# Patient Record
Sex: FEMALE | Race: WHITE | NOT HISPANIC OR LATINO | Employment: FULL TIME
[De-identification: names, ages, dates, MRNs, and addresses within clinical notes are randomized per-mention and may not be internally consistent; named-entity substitution may affect disease eponyms.]

---

## 2015-08-17 LAB
HPV_EXT - HISTORICAL: NORMAL
PAP SMEAR - HIM PATIENT REPORTED: NORMAL

## 2017-01-16 ENCOUNTER — RECORDS - HEALTHEAST (OUTPATIENT)
Dept: ADMINISTRATIVE | Facility: OTHER | Age: 32
End: 2017-01-16

## 2017-02-06 ENCOUNTER — COMMUNICATION - HEALTHEAST (OUTPATIENT)
Dept: FAMILY MEDICINE | Facility: CLINIC | Age: 32
End: 2017-02-06

## 2017-03-15 ENCOUNTER — COMMUNICATION - HEALTHEAST (OUTPATIENT)
Dept: FAMILY MEDICINE | Facility: CLINIC | Age: 32
End: 2017-03-15

## 2017-05-15 ENCOUNTER — COMMUNICATION - HEALTHEAST (OUTPATIENT)
Dept: FAMILY MEDICINE | Facility: CLINIC | Age: 32
End: 2017-05-15

## 2017-06-09 ENCOUNTER — OFFICE VISIT - HEALTHEAST (OUTPATIENT)
Dept: FAMILY MEDICINE | Facility: CLINIC | Age: 32
End: 2017-06-09

## 2017-06-09 DIAGNOSIS — R51.9 CHRONIC HEADACHES: ICD-10-CM

## 2017-06-09 DIAGNOSIS — G89.29 CHRONIC HEADACHES: ICD-10-CM

## 2017-06-09 ASSESSMENT — MIFFLIN-ST. JEOR: SCORE: 1493.38

## 2017-06-20 ENCOUNTER — COMMUNICATION - HEALTHEAST (OUTPATIENT)
Dept: FAMILY MEDICINE | Facility: CLINIC | Age: 32
End: 2017-06-20

## 2017-06-21 ENCOUNTER — COMMUNICATION - HEALTHEAST (OUTPATIENT)
Dept: SCHEDULING | Facility: CLINIC | Age: 32
End: 2017-06-21

## 2017-08-18 ENCOUNTER — COMMUNICATION - HEALTHEAST (OUTPATIENT)
Dept: SCHEDULING | Facility: CLINIC | Age: 32
End: 2017-08-18

## 2017-09-08 ENCOUNTER — COMMUNICATION - HEALTHEAST (OUTPATIENT)
Dept: HEALTH INFORMATION MANAGEMENT | Facility: CLINIC | Age: 32
End: 2017-09-08

## 2017-09-15 ENCOUNTER — COMMUNICATION - HEALTHEAST (OUTPATIENT)
Dept: FAMILY MEDICINE | Facility: CLINIC | Age: 32
End: 2017-09-15

## 2017-09-22 ENCOUNTER — OFFICE VISIT - HEALTHEAST (OUTPATIENT)
Dept: FAMILY MEDICINE | Facility: CLINIC | Age: 32
End: 2017-09-22

## 2017-09-22 DIAGNOSIS — J02.9 PHARYNGITIS: ICD-10-CM

## 2017-09-22 DIAGNOSIS — J01.90 ACUTE SINUSITIS: ICD-10-CM

## 2017-09-22 ASSESSMENT — MIFFLIN-ST. JEOR: SCORE: 1511.52

## 2017-09-28 ENCOUNTER — COMMUNICATION - HEALTHEAST (OUTPATIENT)
Dept: FAMILY MEDICINE | Facility: CLINIC | Age: 32
End: 2017-09-28

## 2017-10-28 ENCOUNTER — COMMUNICATION - HEALTHEAST (OUTPATIENT)
Dept: FAMILY MEDICINE | Facility: CLINIC | Age: 32
End: 2017-10-28

## 2017-12-12 ENCOUNTER — COMMUNICATION - HEALTHEAST (OUTPATIENT)
Dept: FAMILY MEDICINE | Facility: CLINIC | Age: 32
End: 2017-12-12

## 2018-02-02 ENCOUNTER — COMMUNICATION - HEALTHEAST (OUTPATIENT)
Dept: FAMILY MEDICINE | Facility: CLINIC | Age: 33
End: 2018-02-02

## 2018-02-13 ENCOUNTER — COMMUNICATION - HEALTHEAST (OUTPATIENT)
Dept: FAMILY MEDICINE | Facility: CLINIC | Age: 33
End: 2018-02-13

## 2018-02-22 ENCOUNTER — OFFICE VISIT - HEALTHEAST (OUTPATIENT)
Dept: FAMILY MEDICINE | Facility: CLINIC | Age: 33
End: 2018-02-22

## 2018-02-22 DIAGNOSIS — R51.9 HEADACHE: ICD-10-CM

## 2018-02-22 LAB
ALBUMIN SERPL-MCNC: 4.2 G/DL (ref 3.5–5)
ALP SERPL-CCNC: 96 U/L (ref 45–120)
ALT SERPL W P-5'-P-CCNC: 22 U/L (ref 0–45)
ANION GAP SERPL CALCULATED.3IONS-SCNC: 10 MMOL/L (ref 5–18)
AST SERPL W P-5'-P-CCNC: 24 U/L (ref 0–40)
BILIRUB SERPL-MCNC: 0.6 MG/DL (ref 0–1)
BUN SERPL-MCNC: 12 MG/DL (ref 8–22)
CALCIUM SERPL-MCNC: 9.5 MG/DL (ref 8.5–10.5)
CHLORIDE BLD-SCNC: 104 MMOL/L (ref 98–107)
CO2 SERPL-SCNC: 25 MMOL/L (ref 22–31)
CREAT SERPL-MCNC: 0.71 MG/DL (ref 0.6–1.1)
ERYTHROCYTE [DISTWIDTH] IN BLOOD BY AUTOMATED COUNT: 10.8 % (ref 11–14.5)
GFR SERPL CREATININE-BSD FRML MDRD: >60 ML/MIN/1.73M2
GLUCOSE BLD-MCNC: 81 MG/DL (ref 70–125)
HCT VFR BLD AUTO: 40.7 % (ref 35–47)
HGB BLD-MCNC: 14.2 G/DL (ref 12–16)
MCH RBC QN AUTO: 33.1 PG (ref 27–34)
MCHC RBC AUTO-ENTMCNC: 35 G/DL (ref 32–36)
MCV RBC AUTO: 95 FL (ref 80–100)
PLATELET # BLD AUTO: 247 THOU/UL (ref 140–440)
PMV BLD AUTO: 6.7 FL (ref 7–10)
POTASSIUM BLD-SCNC: 4.2 MMOL/L (ref 3.5–5)
PROT SERPL-MCNC: 8 G/DL (ref 6–8)
RBC # BLD AUTO: 4.29 MILL/UL (ref 3.8–5.4)
SODIUM SERPL-SCNC: 139 MMOL/L (ref 136–145)
TSH SERPL DL<=0.005 MIU/L-ACNC: 1.15 UIU/ML (ref 0.3–5)
VIT B12 SERPL-MCNC: 482 PG/ML (ref 213–816)
WBC: 4.8 THOU/UL (ref 4–11)

## 2018-02-22 ASSESSMENT — MIFFLIN-ST. JEOR: SCORE: 1506.99

## 2018-04-03 ENCOUNTER — OFFICE VISIT - HEALTHEAST (OUTPATIENT)
Dept: FAMILY MEDICINE | Facility: CLINIC | Age: 33
End: 2018-04-03

## 2018-04-03 DIAGNOSIS — J18.1 LOBAR PNEUMONIA, UNSPECIFIED ORGANISM (H): ICD-10-CM

## 2018-04-03 DIAGNOSIS — J18.9 PNEUMONIA OF LEFT LOWER LOBE DUE TO INFECTIOUS ORGANISM: ICD-10-CM

## 2018-04-03 LAB
ALBUMIN UR-MCNC: NEGATIVE MG/DL
ANION GAP SERPL CALCULATED.3IONS-SCNC: 11 MMOL/L (ref 5–18)
APPEARANCE UR: CLEAR
BASOPHILS # BLD AUTO: 0 THOU/UL (ref 0–0.2)
BASOPHILS NFR BLD AUTO: 0 % (ref 0–2)
BILIRUB UR QL STRIP: NEGATIVE
BUN SERPL-MCNC: 10 MG/DL (ref 8–22)
CALCIUM SERPL-MCNC: 9.5 MG/DL (ref 8.5–10.5)
CHLORIDE BLD-SCNC: 102 MMOL/L (ref 98–107)
CO2 SERPL-SCNC: 26 MMOL/L (ref 22–31)
COLOR UR AUTO: YELLOW
CREAT SERPL-MCNC: 0.78 MG/DL (ref 0.6–1.1)
EOSINOPHIL # BLD AUTO: 0 THOU/UL (ref 0–0.4)
EOSINOPHIL NFR BLD AUTO: 0 % (ref 0–6)
ERYTHROCYTE [DISTWIDTH] IN BLOOD BY AUTOMATED COUNT: 11.8 % (ref 11–14.5)
FLUAV AG SPEC QL IA: NORMAL
FLUBV AG SPEC QL IA: NORMAL
GFR SERPL CREATININE-BSD FRML MDRD: >60 ML/MIN/1.73M2
GLUCOSE BLD-MCNC: 94 MG/DL (ref 70–125)
GLUCOSE UR STRIP-MCNC: NEGATIVE MG/DL
HCG UR QL: NEGATIVE
HCT VFR BLD AUTO: 41.9 % (ref 35–47)
HGB BLD-MCNC: 15 G/DL (ref 12–16)
HGB UR QL STRIP: ABNORMAL
KETONES UR STRIP-MCNC: ABNORMAL MG/DL
LEUKOCYTE ESTERASE UR QL STRIP: NEGATIVE
LYMPHOCYTES # BLD AUTO: 0.6 THOU/UL (ref 0.8–4.4)
LYMPHOCYTES NFR BLD AUTO: 17 % (ref 20–40)
MCH RBC QN AUTO: 33 PG (ref 27–34)
MCHC RBC AUTO-ENTMCNC: 35.8 G/DL (ref 32–36)
MCV RBC AUTO: 92 FL (ref 80–100)
MONOCYTES # BLD AUTO: 0.3 THOU/UL (ref 0–0.9)
MONOCYTES NFR BLD AUTO: 9 % (ref 2–10)
NEUTROPHILS # BLD AUTO: 2.7 THOU/UL (ref 2–7.7)
NEUTROPHILS NFR BLD AUTO: 74 % (ref 50–70)
NITRATE UR QL: NEGATIVE
PH UR STRIP: 8.5 [PH] (ref 5–8)
PLATELET # BLD AUTO: 157 THOU/UL (ref 140–440)
PMV BLD AUTO: 9.1 FL (ref 8.5–12.5)
POTASSIUM BLD-SCNC: 4.2 MMOL/L (ref 3.5–5)
RBC # BLD AUTO: 4.55 MILL/UL (ref 3.8–5.4)
SODIUM SERPL-SCNC: 139 MMOL/L (ref 136–145)
SP GR UR STRIP: 1.01 (ref 1–1.03)
SP GR UR STRIP: 1.01 (ref 1–1.03)
UROBILINOGEN UR STRIP-ACNC: ABNORMAL
WBC: 3.7 THOU/UL (ref 4–11)

## 2018-04-13 ENCOUNTER — OFFICE VISIT - HEALTHEAST (OUTPATIENT)
Dept: FAMILY MEDICINE | Facility: CLINIC | Age: 33
End: 2018-04-13

## 2018-04-13 DIAGNOSIS — J18.9 PNEUMONIA OF LEFT LOWER LOBE DUE TO INFECTIOUS ORGANISM: ICD-10-CM

## 2018-04-25 ENCOUNTER — COMMUNICATION - HEALTHEAST (OUTPATIENT)
Dept: HEALTH INFORMATION MANAGEMENT | Facility: CLINIC | Age: 33
End: 2018-04-25

## 2018-04-25 ENCOUNTER — COMMUNICATION - HEALTHEAST (OUTPATIENT)
Dept: TELEHEALTH | Facility: CLINIC | Age: 33
End: 2018-04-25

## 2018-06-20 ENCOUNTER — COMMUNICATION - HEALTHEAST (OUTPATIENT)
Dept: FAMILY MEDICINE | Facility: CLINIC | Age: 33
End: 2018-06-20

## 2018-09-11 ENCOUNTER — COMMUNICATION - HEALTHEAST (OUTPATIENT)
Dept: FAMILY MEDICINE | Facility: CLINIC | Age: 33
End: 2018-09-11

## 2018-09-11 DIAGNOSIS — R51.9 HEADACHE: ICD-10-CM

## 2018-09-13 ENCOUNTER — COMMUNICATION - HEALTHEAST (OUTPATIENT)
Dept: FAMILY MEDICINE | Facility: CLINIC | Age: 33
End: 2018-09-13

## 2018-10-16 ENCOUNTER — COMMUNICATION - HEALTHEAST (OUTPATIENT)
Dept: FAMILY MEDICINE | Facility: CLINIC | Age: 33
End: 2018-10-16

## 2018-11-02 ENCOUNTER — COMMUNICATION - HEALTHEAST (OUTPATIENT)
Dept: FAMILY MEDICINE | Facility: CLINIC | Age: 33
End: 2018-11-02

## 2018-11-02 ENCOUNTER — OFFICE VISIT - HEALTHEAST (OUTPATIENT)
Dept: FAMILY MEDICINE | Facility: CLINIC | Age: 33
End: 2018-11-02

## 2018-11-02 DIAGNOSIS — Z82.49 FAMILY HISTORY OF CARDIAC DISORDER: ICD-10-CM

## 2018-11-02 DIAGNOSIS — J35.8 APHTHOUS ULCER OF TONSIL: ICD-10-CM

## 2018-11-02 ASSESSMENT — MIFFLIN-ST. JEOR: SCORE: 1527.68

## 2018-11-12 ENCOUNTER — COMMUNICATION - HEALTHEAST (OUTPATIENT)
Dept: FAMILY MEDICINE | Facility: CLINIC | Age: 33
End: 2018-11-12

## 2018-11-12 DIAGNOSIS — R09.89 TONSIL SYMPTOM: ICD-10-CM

## 2018-11-16 ENCOUNTER — OFFICE VISIT - HEALTHEAST (OUTPATIENT)
Dept: OTOLARYNGOLOGY | Facility: CLINIC | Age: 33
End: 2018-11-16

## 2018-11-16 DIAGNOSIS — J35.9 LESION OF TONSIL: ICD-10-CM

## 2018-11-20 ENCOUNTER — COMMUNICATION - HEALTHEAST (OUTPATIENT)
Dept: FAMILY MEDICINE | Facility: CLINIC | Age: 33
End: 2018-11-20

## 2018-11-21 ENCOUNTER — COMMUNICATION - HEALTHEAST (OUTPATIENT)
Dept: OTOLARYNGOLOGY | Facility: CLINIC | Age: 33
End: 2018-11-21

## 2018-11-21 LAB
LAB AP CHARGES (HE HISTORICAL CONVERSION): NORMAL
PATH REPORT.COMMENTS IMP SPEC: NORMAL
PATH REPORT.COMMENTS IMP SPEC: NORMAL
PATH REPORT.FINAL DX SPEC: NORMAL
PATH REPORT.GROSS SPEC: NORMAL
PATH REPORT.MICROSCOPIC SPEC OTHER STN: NORMAL
PATH REPORT.RELEVANT HX SPEC: NORMAL
RESULT FLAG (HE HISTORICAL CONVERSION): NORMAL

## 2018-11-26 ENCOUNTER — COMMUNICATION - HEALTHEAST (OUTPATIENT)
Dept: OTOLARYNGOLOGY | Facility: CLINIC | Age: 33
End: 2018-11-26

## 2018-11-27 ENCOUNTER — AMBULATORY - HEALTHEAST (OUTPATIENT)
Dept: OTOLARYNGOLOGY | Facility: CLINIC | Age: 33
End: 2018-11-27

## 2018-11-27 DIAGNOSIS — J35.8 APHTHOUS ULCER OF TONSIL: ICD-10-CM

## 2018-12-07 ENCOUNTER — AMBULATORY - HEALTHEAST (OUTPATIENT)
Dept: LAB | Facility: CLINIC | Age: 33
End: 2018-12-07

## 2018-12-07 DIAGNOSIS — Z82.49 FAMILY HISTORY OF CARDIAC DISORDER: ICD-10-CM

## 2018-12-07 LAB
CHOLEST SERPL-MCNC: 133 MG/DL
FASTING STATUS PATIENT QL REPORTED: YES
HDLC SERPL-MCNC: 67 MG/DL
LDLC SERPL CALC-MCNC: 55 MG/DL
TRIGL SERPL-MCNC: 55 MG/DL

## 2018-12-11 ENCOUNTER — COMMUNICATION - HEALTHEAST (OUTPATIENT)
Dept: FAMILY MEDICINE | Facility: CLINIC | Age: 33
End: 2018-12-11

## 2018-12-14 ENCOUNTER — OFFICE VISIT - HEALTHEAST (OUTPATIENT)
Dept: OTOLARYNGOLOGY | Facility: CLINIC | Age: 33
End: 2018-12-14

## 2018-12-14 DIAGNOSIS — R07.0 THROAT PAIN: ICD-10-CM

## 2018-12-31 ENCOUNTER — COMMUNICATION - HEALTHEAST (OUTPATIENT)
Dept: OTOLARYNGOLOGY | Facility: CLINIC | Age: 33
End: 2018-12-31

## 2018-12-31 DIAGNOSIS — E04.1 THYROID NODULE: ICD-10-CM

## 2019-01-04 ENCOUNTER — COMMUNICATION - HEALTHEAST (OUTPATIENT)
Dept: OTOLARYNGOLOGY | Facility: CLINIC | Age: 34
End: 2019-01-04

## 2019-01-04 ENCOUNTER — COMMUNICATION - HEALTHEAST (OUTPATIENT)
Dept: FAMILY MEDICINE | Facility: CLINIC | Age: 34
End: 2019-01-04

## 2019-02-01 ENCOUNTER — RECORDS - HEALTHEAST (OUTPATIENT)
Dept: ADMINISTRATIVE | Facility: OTHER | Age: 34
End: 2019-02-01

## 2019-02-13 ENCOUNTER — COMMUNICATION - HEALTHEAST (OUTPATIENT)
Dept: FAMILY MEDICINE | Facility: CLINIC | Age: 34
End: 2019-02-13

## 2019-08-23 ENCOUNTER — RECORDS - HEALTHEAST (OUTPATIENT)
Dept: HEALTH INFORMATION MANAGEMENT | Facility: CLINIC | Age: 34
End: 2019-08-23

## 2021-05-31 ENCOUNTER — RECORDS - HEALTHEAST (OUTPATIENT)
Dept: ADMINISTRATIVE | Facility: CLINIC | Age: 36
End: 2021-05-31

## 2021-05-31 VITALS — WEIGHT: 170 LBS | HEIGHT: 69 IN | BODY MASS INDEX: 25.18 KG/M2

## 2021-05-31 VITALS — WEIGHT: 166 LBS | HEIGHT: 69 IN | BODY MASS INDEX: 24.59 KG/M2

## 2021-06-01 ENCOUNTER — RECORDS - HEALTHEAST (OUTPATIENT)
Dept: ADMINISTRATIVE | Facility: CLINIC | Age: 36
End: 2021-06-01

## 2021-06-01 VITALS — WEIGHT: 169.2 LBS | BODY MASS INDEX: 25.17 KG/M2

## 2021-06-01 VITALS — HEIGHT: 69 IN | BODY MASS INDEX: 25.03 KG/M2 | WEIGHT: 169 LBS

## 2021-06-01 VITALS — WEIGHT: 163 LBS | BODY MASS INDEX: 24.25 KG/M2

## 2021-06-02 ENCOUNTER — RECORDS - HEALTHEAST (OUTPATIENT)
Dept: ADMINISTRATIVE | Facility: CLINIC | Age: 36
End: 2021-06-02

## 2021-06-02 VITALS — HEIGHT: 69 IN | WEIGHT: 173.56 LBS | BODY MASS INDEX: 25.71 KG/M2

## 2021-06-11 NOTE — PROGRESS NOTES
SUBJECTIVE:    This is a pleasant 32-year-old female who presents to the clinic as she has had chronic and recurrent headaches. Please see history as noted previously. As noted she continues to have a recurrence of severe headaches and has followed up with Dr. Smith of neurology previously. She states that there are times where she will have discomfort in the back of her neck and it will feel hot. She will get a radiation of pain from the occipital area to the left forehead area. She sometimes has discomfort involving the top of her head. She denies associated visual changes typically. These headaches may persist 1/2 to 1 hour at a time. She was evaluated by [facility]. It was felt that she had occipital neuralgia. She previously has been treated by a chiropractor. She has tried high doses of magnesium without significant relief. She reportedly did have an MRI the cervical spine without significant cervical nerve root impingement. They did discuss treatment with an occipital nerve block that was done without significant improvement. She cannot think of any specific triggers.  More recently they have been talking about trigger point injections but she does not know if she wants to consider this.  She does have significant number of stressors.      Patient Active Problem List   Diagnosis     Fatigue     Headache     Vitamin D Deficiency     Abdominal Pain In The Central Upper Belly (Epigastric)     Anemia     Dysmenorrhea       Current Outpatient Prescriptions on File Prior to Visit   Medication Sig     MULTIVIT &MINERALS/FERROUS FUM (MULTI VITAMIN ORAL) Take by mouth.     oxyCODONE-acetaminophen (PERCOCET) 7.5-325 mg per tablet Take 1 tablet by mouth every 4 (four) hours as needed for pain.     No current facility-administered medications on file prior to visit.        Allergies   Allergen Reactions     Sulfa (Sulfonamide Antibiotics) Hives     Vicodin [Hydrocodone-Acetaminophen]      Sulfasalazine  Rash            A 12 point comprehensive review of systems was negative except as noted.      OBJECTIVE:     Vitals:    06/09/17 1135   BP: 110/60   Pulse: 84   Resp: 16   Temp: 98.4  F (36.9  C)       General: Alert, not acutely distressed   Head:  Atraumatic, normocephalic  Eyes:  PERRL, extraocular movements are intact  Nose:  Septum midline  Throat:  Oral mucosa moist, oropharynx clear  Neck:  Supple without adenopathy or thyromegaly   Cardiovascular: S1-S2 with regular rate and without murmur, rub, or gallop   Lungs:  Clear to auscultation bilaterally   Extremities: Without cyanosis or edema   Neuro:  CN II-XII appear intact        Laboratory Results:    Results for orders placed or performed in visit on 09/15/16   Basic Metabolic Panel   Result Value Ref Range    Sodium 139 136 - 145 mmol/L    Potassium 4.4 3.5 - 5.0 mmol/L    Chloride 104 98 - 107 mmol/L    CO2 23 22 - 31 mmol/L    Anion Gap, Calculation 12 5 - 18 mmol/L    Glucose 91 70 - 125 mg/dL    Calcium 9.2 8.5 - 10.5 mg/dL    BUN 10 8 - 22 mg/dL    Creatinine 0.75 0.60 - 1.10 mg/dL    GFR MDRD Af Amer >60 >60 mL/min/1.73m2    GFR MDRD Non Af Amer >60 >60 mL/min/1.73m2         ASSESSMENT AND PLAN:    1. Chronic headaches  Occipital Neuralgia      Reviewed her headache history.  She has had an evaluation by neurology  Recommend she pay attention to possible triggers  Tryptans have not been very helpful in the past  She may consider therapeutic massage  Consider a TENS unit  Discussed that she could consider follow-up with neurology to discuss possible trigger point injections / Botox injections  Recommend using Percocet sparingly  Consider another opinion with neurology    25 minutes were spent with the patient and greater than 50% of the time was spent in face to face counseling and coordination of care      Dima Fay MD      This note has been dictated and transcribed using voice recognition software.  Any grammatical or contextual  distortions are unintentional and inherent to the software.

## 2021-06-13 NOTE — PROGRESS NOTES
Assessment/ Plan     1. Acute sinusitis    Recommend symptomatic treatment with fluids and rest  She may consider a Surprise pot  Recommend use of Mucinex  Treat with a course of prednisone  Antibiotics not prescribed currently.  We will try conservative measures first  Consider further evaluation with ENT or possibly a CT of the sinuses    2. Pharyngitis    Rapid strep test is negative and a throat culture is pending  - Rapid Strep A Screen-Throat  - Group A Strep, RNA Direct Detection, Throat    3.  Headaches, occipital region    Discussed she can consider conservative measures  Discussed possibility of a referral for acupuncture    25 minutes were spent with the patient and greater than 50% of the time was spent in face to face counseling and coordination of care      Subjective:       Nereyda Trivedi is a 32 y.o. female who presents to the clinic as she has had a unusual constellation of symptoms.  She has had a sore throat and notes that there is a discoloration involving her tonsil on the right side.  Her energy level has been low.  She has experienced significant nasal congestion and has had a sense of pressure in her cheeks.  She has been evaluated previously for chronic headaches and neck pain.  She has been assessed by neurology.  It was felt that she had occipital neuralgia. She previously has been treated by a chiropractor. She has tried high doses of magnesium without significant relief. She reportedly did have an MRI the cervical spine without significant cervical nerve root impingement. They did discuss treatment with an occipital nerve block that was done without significant improvement.  She is contemplating other treatments.    The following portions of the patient's history were reviewed and updated as appropriate: allergies, current medications, past family history, past medical history, past social history, past surgical history and problem list.    Review of Systems   A 12 point comprehensive  "review of systems was negative except as noted.      Current Outpatient Prescriptions   Medication Sig Dispense Refill     MULTIVIT &MINERALS/FERROUS FUM (MULTI VITAMIN ORAL) Take by mouth.       norgestimate-ethinyl estradiol (TRI-ESTARYLLA) 0.18/0.215/0.25 mg-35 mcg (28) Tab tablet Take 1 tablet by mouth.       oxyCODONE-acetaminophen (PERCOCET) 7.5-325 mg per tablet Take 1 tablet by mouth every 4 (four) hours as needed for pain. 30 tablet 0     WALLACE 0.35 mg tablet Take 0.35 tablets by mouth daily.  4     predniSONE (DELTASONE) 20 MG tablet Take one PO BID x 5 days 10 tablet 0     No current facility-administered medications for this visit.        Objective:      /60 (Patient Site: Left Arm, Patient Position: Sitting, Cuff Size: Adult Regular)  Pulse 84  Temp 97.7  F (36.5  C) (Oral)   Resp 16  Ht 5' 8.75\" (1.746 m)  Wt 170 lb (77.1 kg)  LMP 09/08/2017 (Approximate)  SpO2 100%  BMI 25.29 kg/m2      General appearance: alert, appears stated age and cooperative  Head: Normocephalic, without obvious abnormality, atraumatic  Eyes: conjunctivae/corneas clear. PERRL, EOM's intact.   Ears: normal TM's and external ear canals both ears  Nose: Nares normal. Septum midline. Mucosa normal. No drainage or sinus tenderness.  Throat: Oropharynx mildly erythematous with a bluish discoloration in the right palatine tonsil area  Neck: no adenopathy, supple, symmetrical, trachea midline and thyroid not enlarged, symmetric, no tenderness/mass/nodules  Back: symmetric, no curvature. ROM normal. No CVA tenderness.  Lungs: clear to auscultation bilaterally  Heart: regular rate and rhythm, S1, S2 normal, no murmur, click, rub or gallop  Neurologic: Alert and oriented X 3. Normal coordination and gait         No results found for this or any previous visit (from the past 168 hour(s)).       This note has been dictated using voice recognition software. Any grammatical or context distortions are unintentional and inherent " to the software

## 2021-06-16 NOTE — PROGRESS NOTES
Assessment/ Plan     1. Headache  Consider atypical migraine versus bicipital neuralgia versus other etiology    Reviewed previous history including MRI of the brain as well as cervical spine  She has followed up with Dr. Smith of [facility]    Recommend follow-up with [facility] for a second opinion  Check laboratory testing  Recommend monitoring for triggers including possible food triggers  She has been treated symptomatically with Tylenol and anti-inflammatory medication as needed  Recommend using Percocet sparingly    Recommend follow-up if having persistent symptoms  - Comprehensive Metabolic Panel  - HM2(CBC w/o Differential)  - Thyroid Cascade  - Vitamin B12  - Ambulatory referral to Neurology    Patient agrees to plan    25 minutes were spent with the patient and greater than 50% of the time was spent in face to face counseling and coordination of care      Subjective:       Nereyda Trivedi is a 32 y.o. female who presents to the clinic in follow-up for recurrent headaches.  She has a history of headaches that are frequent and consistent.  She has a long-standing history of headaches and as noted previously will have recurrence of severe headaches.  These typically start in the area of her neck and back of her head.  They may radiate to the top of her head or to the left side.  More recently, she has been experiencing right-sided pain.  These headaches may persist for 1/2 to 1 hour at a time.  In the past [facility].  It was felt that she had occipital neuralgia.  She did have an MRI of the cervical spine without significant cervical nerve impingement.  An occipital nerve block did not result in improvement.  They did discuss possible trigger point injections but she has been reluctant to consider this.  She has seen Dr. Smith previously.  She also was followed up with her chiropractor.  She has tried high doses of magnesium.  She  "has taken Tylenol, Excedrin, and oxycodone for pain.  She does have a lot of stress and some anxiety.  She is active.  These headaches can be disabling at times.    She has had previous imaging of the brain as well as the cervical spine.  At one point she was referred to [email or web address] of neurology but did not follow-up there.  This has been very challenging for her.  She denies focal weakness or numbness and tingling in extremities.        The following portions of the patient's history were reviewed and updated as appropriate: allergies, current medications, past family history, past medical history, past social history, past surgical history and problem list.    Review of Systems   A 12 point comprehensive review of systems was negative except as noted.      Current Outpatient Prescriptions   Medication Sig Dispense Refill     MULTIVIT &MINERALS/FERROUS FUM (MULTI VITAMIN ORAL) Take by mouth.       norgestimate-ethinyl estradiol (TRI-ESTARYLLA) 0.18/0.215/0.25 mg-35 mcg (28) Tab tablet Take 1 tablet by mouth.       No current facility-administered medications for this visit.        Objective:      /60  Pulse 76  Temp 98.2  F (36.8  C) (Oral)   Resp 16  Ht 5' 8.75\" (1.746 m)  Wt 169 lb (76.7 kg)  BMI 25.14 kg/m2      General appearance: alert, appears stated age and cooperative  Head: Normocephalic, without obvious abnormality, atraumatic  Eyes: conjunctivae/corneas clear. PERRL, EOM's intact.   Ears: normal TM's and external ear canals both ears  Nose: Nares normal. Septum midline. Mucosa normal. No drainage or sinus tenderness.  Throat: lips, mucosa, and tongue normal; teeth and gums normal  Neck: no adenopathy, supple, symmetrical, trachea midline and thyroid not enlarged  Lungs: clear to auscultation bilaterally  Heart: regular rate and rhythm, S1, S2 normal, no murmur, click, rub or gallop  Extremities: extremities normal, atraumatic, no cyanosis or edema  Skin: Skin color, texture, turgor " normal. No rashes or lesions  Lymph nodes: Cervical nodes normal.  Neurologic: Alert and oriented X 3. Normal coordination and gait         No results found for this or any previous visit (from the past 168 hour(s)).       This note has been dictated using voice recognition software. Any grammatical or context distortions are unintentional and inherent to the software

## 2021-06-17 NOTE — PROGRESS NOTES
Chief Complaint   Patient presents with     Follow-up     pneumonia          HPI:   Nereyda Trivedi is a 33 y.o. female in for follow up of pneumonia.  Seen on 4/3 and diagnosed with pneumonia.  Treated with levoquin.  There was a concern regarding PE,  CT showed no PE but left lower lobe pneumonia.  She has greatly improved.  Still some cough.  Gets winded a little more easily. No more fever.       ROS:  A 10 point comprehensive review of systems was negative except as noted.     Medications:  Current Outpatient Prescriptions on File Prior to Visit   Medication Sig Dispense Refill     MULTIVIT &MINERALS/FERROUS FUM (MULTI VITAMIN ORAL) Take by mouth.       norgestimate-ethinyl estradiol (TRI-ESTARYLLA) 0.18/0.215/0.25 mg-35 mcg (28) Tab tablet Take 1 tablet by mouth.       No current facility-administered medications on file prior to visit.          Social History:  Social History   Substance Use Topics     Smoking status: Never Smoker     Smokeless tobacco: Never Used      Comment: social     Alcohol use Not on file         Physical Exam:   Vitals:    04/13/18 0948   BP: 96/60   Pulse: 84   Resp: 12   Temp: 98.1  F (36.7  C)   Weight: 169 lb 3.2 oz (76.7 kg)       GENERAL:   Alert. Oriented.  EYES: Clear  HENT:  Ears: R TM pearly gray. Normal landmarks. L TM pearly gray.  Normal landmarks  Nose: Clear.  Sinuses: Nontender.  Oropharynx:  No erythema. No exudate.  NECK: Supple. No adenopathy.  LUNGS: Clear to ascultation.  No crackles.  No wheezing  HEART: RRR  SKIN:  No rash.     CHART REVIEW  DATE[facility]place of visit[facility], 4[facility]3/2018  DX: pneumonia  TESTS: CT PE run--LLL pneumonia.  No PE, normal CBC, UA,BMP negative flu  TREATMENT: Levaquin     Assessment/Plan:    1. Pneumonia of left lower lobe due to infectious organism        Improving with clear lung exam.  May resume normal activities as tolerated.  Recheck if not completely improved in 6-8 weeks.          Hayder Blackmon MD      4/13/2018

## 2021-06-17 NOTE — PROGRESS NOTES
"ASSESSMENT:   1. Pneumonia of left lower lobe due to infectious organism  Urinalysis    Pregnancy (Beta-hCG, Qual), Urine    HM1(CBC and Differential)    Basic Metabolic Panel    Influenza A/B Rapid Test    HM1 (CBC with Diff)    CTA Chest PE Run    CTA Chest PE Run    levoFLOXacin (LEVAQUIN) 750 MG tablet       PLAN:  32-year-old otherwise healthy female presents with several days of fever, cough, body aches, painful inspiration, dark sputum with questionable hemoptysis and tachycardia.  Of note, patient recently traveled to [place] via plane.  Positive exogenous estrogen use.  No calf tenderness or edema on exam.  Diminished left-sided breath sounds.  No chest wall tenderness.  Given multiple risk factors, questionable hemoptysis and tachycardia with pleuritic chest pain, I did elect to do a CT PE run of the patient's chest.  These results are negative for PE, does reveal a LLL pneumonia.  Labs are obtained, WBC 3.7, no electrolyte derangement.  Reexamination of the patient following ibuprofen reveals tachycardia to be improved, heart rate 108 on repeat exam.  Patient is treated for a community-acquired pneumonia with 5 days of levofloxacin.  Reasons for return either to clinic or emergency room were discussed at length.  She will follow-up with her primary care provider in 1 week for recheck.    I discussed red flag symptoms, return precautions to clinic/ER and follow up care with patient/parent.  Expected clinical course, symptomatic cares advised. Questions answered. Patient/parent amenable with plan.    Patient Instructions:  Patient Instructions   You have a left lower lobe pneumonia.  I have prescribed an antibiotic to treat this.    1. Take Levaquin twice daily with food. Take a probiotic such as Culturelle or Florastor while on the antibiotic or eat a Greek yogurt containing \"live active cultures\" daily.    Please use Tylenol 650mg every 4 hours or ibuprofen 600mg every 6 hours by mouth for pain/fever.  Do " not exceed 4000mg of acetaminophen or 2400mg of ibuprofen from any source in a 24 hour period.  Taking Tylenol and ibuprofen together may be helpful in reducing pain.    Return to clinic, ER with high fever not responding to tylenol/motrin, shortness of breath or severe worsening of symptoms.  See your primary care physician next week for a recheck.    Ciprofloxacin/Levofloxacin Discharge Instructions:  You have been prescribed the antibiotic, Levofloxacin, and will need to take it for the full course as prescribed.    This medicine has been associated with rare cases of tendinitis (inflammation of your tendons) and tendon rupture. This risk increases if you are also taking steroids.  Please stop this medicine and call your primary care provider if you develop joint pain, muscle aches, difficulty walking, or swelling in your arms or legs.    Rare, but possible, side effects of this medication also include changes in mood, hallucinations, difficulty sleeping and confusion.    Avoid taking this medication with milk, dairy products, or calcium-fortified juices or within 2 hours of iron or calcium supplementation.  Use sunscreen and avoid excessive sun exposure while taking this medicine because you are at higher risk of sunburn.      If you are diabetic, this medication could increase or decrease your blood sugars.  Please monitor your blood sugar closely while on this antibiotic.          SUBJECTIVE:   Nereyda Trivedi is a 32 y.o. female who presents today with 3 days of fever, cough, sore throat, body aches, chills. Bought some azithromycin in [place] and started that 2 days ago.  Decreased appetite, not drinking much.  Some facial pain.  Does also note some right sided neck stiffness.  Brown sputum, questionable hemoptysis. No calf pain or swelling. Does use OCP. Returned from trip to [place] last week. Notes painful inspiration, particularly to left low back.  Also complains of right discomfort. Denies urinary  frequency, dysuria or hematuria.  ROS:  Comprehensive 12 pt ROS completed, positives noted in HPI, otherwise negative.      Past Medical History:  Patient Active Problem List   Diagnosis     Fatigue     Headache     Vitamin D Deficiency     Abdominal Pain In The Central Upper Belly (Epigastric)     Anemia     Dysmenorrhea       Surgical History:  Past Surgical History:   Procedure Laterality Date     TX RX ECTOP PREG BY SCOPE,RMV TUBE/OVRY      Description: Laparoscopic Excision Of Ectopic Preg & Salpingectomy Left;  Recorded: 07/31/2014;           Family History:  No family history on file.    Reviewed; Non-contributory    History   Smoking Status     Never Smoker   Smokeless Tobacco     Never Used     Comment: social           Current Medications:  Current Outpatient Prescriptions on File Prior to Visit   Medication Sig Dispense Refill     MULTIVIT &MINERALS/FERROUS FUM (MULTI VITAMIN ORAL) Take by mouth.       norgestimate-ethinyl estradiol (TRI-ESTARYLLA) 0.18/0.215/0.25 mg-35 mcg (28) Tab tablet Take 1 tablet by mouth.       No current facility-administered medications on file prior to visit.        Allergies:   Allergies   Allergen Reactions     Sulfa (Sulfonamide Antibiotics) Hives     Vicodin [Hydrocodone-Acetaminophen]      Sulfasalazine Rash       OBJECTIVE:   Vitals:    04/03/18 1638   BP: 120/85   Pulse: (!) 130   Resp: 16   Temp: 100  F (37.8  C)   TempSrc: Oral   SpO2: 98%   Weight: 163 lb (73.9 kg)     Physical exam reveals a pleasant 32 y.o. female.   Appears healthy, alert and cooperative. Non-toxic appearance.  Eyes:  No conjunctivitis, lids normal.   Ears:  Normal appearing auricle. External auditory meatus without excessive cerumen, edema or erythema. normal TMs bilaterally and normal canals bilaterally. No mastoid tenderness. No pain with palpation over tragus.  Nose:    Mucosa normal. Mild clear rhinorrhea. Mild frontal sinus tenderness.  Mouth:  Mucosa pink and moist.  mild erythema. No  trismus. No evidence of PTA. Normal phonation.  Neck: few small anterior cervical nodes. Right trapezius tenderness, palpable spasm. No meningismus.  Lungs: Left lung fields diminished throughout, no wheezes, rales or rhonchi. Right lung clear.  Heart: Tachycardic, regular rhythm, no murmur, rub or gallop  Abdomen: soft, nontender. No masses or organomegaly  Skin: pink, warm, dry, and without lesions on limited skin exam. No rashes.     RADIOLOGY    Cta Chest Pe Run    Result Date: 4/3/2018  EXAM DATE:         04/03/2018 [facility] CT ANGIO PULMONARY 4/3/2018 6:30 PM INDICATION: Shortness of breath SOB, tachycardic, multiple ris TECHNIQUE: Helical acquisition through the chest was performed during the arterial phase of contrast enhancement using IV contrast. 2D and 3D reconstructions were performed by the CT technologist. Dose reduction techniques were used. IV CONTRAST: 100ml of ISOVUE 370 was administered. COMPARISON: None. FINDINGS: ANGIOGRAM CHEST: Pulmonary arteries are normal caliber and negative for pulmonary emboli. Normal caliber thoracic aorta with no dissection or aneurysm. RV/LV RATIO: Normal. LUNGS AND PLEURA: There is focal airspace consolidation in the left lower lobe consistent with pneumonia. There is no pneumothorax or pleural effusion. No pulmonary nodules or masses. The right lung is clear. MEDIASTINUM: There is no mediastinal lymphadenopathy there are mildly enlarged left hilar lymph nodes which are likely reactive given the left lower lobe infiltrate. No coronary artery calcification or significant pericardial effusion. LIMITED UPPER ABDOMEN: Negative. MUSCULOSKELETAL: Negative. CONCLUSION: 1.  No evidence for pulmonary embolus in either lung. No thoracic aortic aneurysm or thoracic aortic dissection. 2.  Left lower lobe airspace consolidation consistent with pneumonia. NOTE: ABNORMAL REPORT THE DICTATION ABOVE DESCRIBES AN ABNORMALITY FOR WHICH FOLLOW-UP IS NEEDED.        LABORATORY STUDIES    Recent Results (from the past 24 hour(s))   Basic Metabolic Panel   Result Value Ref Range    Sodium 139 136 - 145 mmol/L    Potassium 4.2 3.5 - 5.0 mmol/L    Chloride 102 98 - 107 mmol/L    CO2 26 22 - 31 mmol/L    Anion Gap, Calculation 11 5 - 18 mmol/L    Glucose 94 70 - 125 mg/dL    Calcium 9.5 8.5 - 10.5 mg/dL    BUN 10 8 - 22 mg/dL    Creatinine 0.78 0.60 - 1.10 mg/dL    GFR MDRD Af Amer >60 >60 mL/min/1.73m2    GFR MDRD Non Af Amer >60 >60 mL/min/1.73m2   Influenza A/B Rapid Test   Result Value Ref Range    Influenza  A, Rapid Antigen No Influenza A antigen detected No Influenza A antigen detected    Influenza B, Rapid Antigen No Influenza B antigen detected No Influenza B antigen detected   HM1 (CBC with Diff)   Result Value Ref Range    WBC 3.7 (L) 4.0 - 11.0 thou/uL    RBC 4.55 3.80 - 5.40 mill/uL    Hemoglobin 15.0 12.0 - 16.0 g/dL    Hematocrit 41.9 35.0 - 47.0 %    MCV 92 80 - 100 fL    MCH 33.0 27.0 - 34.0 pg    MCHC 35.8 32.0 - 36.0 g/dL    RDW 11.8 11.0 - 14.5 %    Platelets 157 140 - 440 thou/uL    MPV 9.1 8.5 - 12.5 fL    Neutrophils % 74 (H) 50 - 70 %    Lymphocytes % 17 (L) 20 - 40 %    Monocytes % 9 2 - 10 %    Eosinophils % 0 0 - 6 %    Basophils % 0 0 - 2 %    Neutrophils Absolute 2.7 2.0 - 7.7 thou/uL    Lymphocytes Absolute 0.6 (L) 0.8 - 4.4 thou/uL    Monocytes Absolute 0.3 0.0 - 0.9 thou/uL    Eosinophils Absolute 0.0 0.0 - 0.4 thou/uL    Basophils Absolute 0.0 0.0 - 0.2 thou/uL   Urinalysis   Result Value Ref Range    Color, UA Yellow Colorless, Yellow, Straw, Light Yellow    Clarity, UA Clear Clear    Glucose, UA Negative Negative    Bilirubin, UA Negative Negative    Ketones, UA 15 mg/dL (!) Negative    Specific Gravity, UA 1.010 1.005 - 1.030    Blood, UA Trace (!) Negative    pH, UA 8.5 (H) 5.0 - 8.0    Protein, UA Negative Negative mg/dL    Urobilinogen, UA 0.2 E.U./dL 0.2 E.U./dL, 1.0 E.U./dL    Nitrite, UA Negative Negative    Leukocytes, UA Negative  Negative   Pregnancy (Beta-hCG, Qual), Urine   Result Value Ref Range    Pregnancy Test, Urine Negative Negative    Specific Gravity, UA 1.010 1.001 - 1.030     Administrations This Visit     ibuprofen tablet 600 mg (ADVIL,MOTRIN)     Admin Date Action Dose Route Administered By             04/03/2018 Given 600 mg Oral Blayne Vaughan, SAMPSON Marley, CNP

## 2021-06-21 NOTE — PROGRESS NOTES
HPI: This patient is a 34 yo who presents for evaluation of the tonsils at the request of Dr. Fay.  About 1 month ago, patient had a sore throat and noticed a white patch on her right tonsil.  She's had this 2 times before and it eventual resolved after a couple rounds of antibiotics and time.  Currently her throat hurts and she has odonyphagia. Was given Magic Mouthwash a couple weeks ago for a presumed ulcer which helps reduce the pain, but spot is still present.  She does have right ear pain and tender lymph nodes on the right.  Some hoarseness over the past couple weeks. Denies fever, weight changes, dysphagia,  No other health concerns at this time. Remote history of smoking - quit 10 years ago.    Past medical history, surgical history, social history, family history, medications, and allergies have been reviewed with the patient and are documented above.    Review of Systems: a 10-system review was performed. Pertinent positives are noted in the HPI and on a separate scanned document in the chart.    PHYSICAL EXAMINATION:  GEN: no acute distress, normocephalic  EYES: extraocular movements are intact, pupils are equal and round. Sclera clear.   EARS: auricles are normally formed. The external auditory canals are clear with minimal to no cerumen. Tympanic membranes are intact bilaterally with no signs of infection, effusion, retractions, or perforations.  NOSE: anterior nares are patent. There are no masses or lesions. The septum is non-obstructing.  OC/OP: clear, dentition is in good repair. The tongue and palate are fully mobile and symmetric. Tonsils are 1+ without exudate. Right tonsil with 2 mm round, white/yellow, firm lesion.  HP/L (scope): nasopharynx, base of tongue, vallecula, epiglottis, and pyriform sinuses are clear. The bilateral vocal folds are mobile. There is no interarytenoid edema and erythema.   NECK: soft and supple. Tender to palpation on the right. Airway is midline.  TMJ: Tender to  palpation over the right TMJ.  NEURO: CN VII and XII are intact symmetric. alert and oriented. No nystagmus. Gait is normal.  PULM: breathing comfortably on room air, normal chest expansion with respiration  HEART: carotid pulses, no peripheral edema    PROCEDURE:  Right tonsil injected with lidocaine and epinephrine. A biopsy was taken of the lesion and hemostasis was achieved with pressure.    MEDICAL DECISION-MAKING: Neredya is a 34 yo with right tonsil lesion.  A biopsy was taken today. Patient may continue magic mouthwash for comfort.  Discussed that ear pain may be due to TMD and discussed lifestyle management. Reassured patient that the remainder of her exam was normal.  Will call patient with results of biopsy once available. She is with good understanding.

## 2021-06-21 NOTE — PROGRESS NOTES
[facility] Note    Nereyda Trivedi  1985   [id number]    Nereyda Trivedi is a 33 y.o. female with significant past medical history of headaches presenting to discuss the following:     CC:   Chief Complaint   Patient presents with     Sore Throat       HPI:    SORE THROAT -   - Bump in back of throat, covered by thick white stuff (pus?) inside tonsil  - Sore throat for few days, has postnasal drainage for last week  - 2 nights ago looked in back of throat and saw  - Also has some ear tenderness/itching deep/pressure  - Has had a few other times in same spot, [facility], had 2 rounds of antibiotics before resolution    Updated problem list, medications, and family history today. She does have strong family history of cardiac disease, mother in 40's, recommended lipid screening. She is not fasting today. Agrees to return for future lab only visit. No personal history of tobacco, hypertension, diabetes, or exertional chest pain.     ROS:   CONSTITUTIONAL: No fevers, chills last night but normal temp, appetite is okay but hurts to eat but cold feels good  HEENT: see above   LUNGS: no cough, chest a little congested   ABDOMEN: no abdominal pain, no nausea    PMH:   Patient Active Problem List   Diagnosis     Headache       No past medical history on file.    PSH:   Past Surgical History:   Procedure Laterality Date     [place] RX ECTOP PREG BY SCOPE,RMV TUBE/OVRY      Description: Laparoscopic Excision Of Ectopic Preg & Salpingectomy Left;  Recorded: 07/31/2014;         MEDICATIONS:   Current Outpatient Prescriptions on File Prior to Visit   Medication Sig Dispense Refill     MULTIVIT &MINERALS/FERROUS FUM (MULTI VITAMIN ORAL) Take by mouth.       [DISCONTINUED] norgestimate-ethinyl estradiol (TRI-ESTARYLLA) 0.18/0.215/0.25 mg-35 mcg (28) Tab tablet Take 1 tablet by mouth.       oxyCODONE-acetaminophen (PERCOCET/ENDOCET) 7.5-325 mg per tablet Take 1 tablet by mouth every 4 (four) hours as needed  "for pain. 25 tablet 0     No current facility-administered medications on file prior to visit.        ALLERGIES:  Allergies   Allergen Reactions     Sulfa (Sulfonamide Antibiotics) Hives     Vicodin [Hydrocodone-Acetaminophen]      Sulfasalazine Rash       FAMHx:  Family History   Problem Relation Age of Onset     Coronary artery disease Mother      upper 40's     Hypertension Mother      No Medical Problems Father      No Medical Problems Brother      No Medical Problems Daughter      No Medical Problems Son      Kidney failure Maternal Grandfather      Liver disease Maternal Grandfather      Heart failure Maternal Grandfather      Diabetes Maternal Grandfather      Emphysema Paternal Grandfather        SOCIAL HISTORY:   Social History     Social History     Marital status:      Spouse name: N/A     Number of children: N/A     Years of education: N/A     Occupational History     Not on file.     Social History Main Topics     Smoking status: Never Smoker     Smokeless tobacco: Never Used      Comment: social     Alcohol use Not on file     Drug use: Not on file     Sexual activity: Not on file     Other Topics Concern     Not on file     Social History Narrative       PHYSICAL EXAM:   /70  Pulse 88  Temp 98  F (36.7  C) (Oral)   Resp 16  Ht 5' 8.75\" (1.746 m)  Wt 173 lb 9 oz (78.7 kg)  BMI 25.82 kg/m2   GENERAL: Nereyda is a well appearing female, appears stated age, in no acute distress. Appropriate weight.   HEENT: Sclera white, no nasal discharge, bilateral tympanic membranes normal, oropharynx is pink and moist. There is a 5 mm white shiny macule right tonsil without drainage. No large tonsillar crypts appreciated. No cervical lymphadenopathy present.   HEART: Regular rate and rhythm, no murmurs.   LUNGS: Clear to auscultation bilaterally, unlabored.     ASSESSMENT & PLAN:     Nereyda Trivedi is a 33 y.o. female presenting for evaluation of sore throat.     1. Aphthous ulcer of tonsil  - " alum/mag hydrox-simethicone-diphenhydramine-lidocaine (MAGIC MOUTHWASH) suspension; Swish and spit 15 mL 4 (four) times a day as needed.  Dispense: 240 mL; Refill: 1    Sore throat secondary to aphthous ulcer right tonsil. Reports recurrent lesion and resolves between episodes. Differential includes herpes simplex, but not consistent with multiple small vesicles or ulceration.     Recommended scheduling ibuprofen 600 mg Q6-8 hours for pain.   May add magic mouth wash as needed and provided with prescription.   If lesion is not resolving, starts draining, gets larger, or develops other concerning symptoms, should return for evaluation. Consider ENT referral.     2. Family history of cardiac disorder  - Lipid Cascade; Future     Given maternal history of CAD prior to 49 yo, did recommend we screen for lipids. No other risk factors.     HM: Declines immunizations today.     RTC: Will return for nurse only blood draw for lipids, return if symptoms worsening or not resolving over next 2 weeks, return at convenience for wellness exam or in 1 year.     Selma Soliz, DO

## 2021-06-22 NOTE — TELEPHONE ENCOUNTER
Controlled Substance Refill Request  Medication:   Requested Prescriptions     Pending Prescriptions Disp Refills     oxyCODONE-acetaminophen (PERCOCET/ENDOCET) 7.5-325 mg per tablet 25 tablet 0     Sig: Take 1 tablet by mouth every 4 (four) hours as needed for pain.     Date Last Fill: 11/21/18  Pharmacy: [id number]   Submit electronically to pharmacy  Controlled Substance Agreement on File:   Encounter-Level CSA Scan Date:    There are no encounter-level csa scan date.       Last office visit: Last office visit pertaining to requested medication was 11/2/18.

## 2021-06-22 NOTE — PROGRESS NOTES
Please call Nereyda with her lab results. Thank you!    Ryan Dawn,    Your cholesterol labs have returned. You have amazing cholesterol numbers. Your total cholesterol is 133, goal less than 200. Your LDL cholesterol (bad cholesterol) is 55, goal less than 130. Your good cholesterol is 67, goal above 50. We no longer treat just to the numbers, but overall risk. Given your age, female gender, and no diabetes, hypertension, or tobacco use, you are at very low risk of cardiovascular disease.     Please let me know if you have any questions or concerns.     Sincerely,  Selma Soliz, DO

## 2021-06-22 NOTE — TELEPHONE ENCOUNTER
I received a refill request for Percocet but have not seen her in almost a year.  I recommend an office visit with me.

## 2021-06-22 NOTE — TELEPHONE ENCOUNTER
NANETTE-  Spoke to pt, appt scheduled on 1/28/19 with Dr. Fay.  She wanted you to know she has an appt scheduled with the specialist regarding her headaches on 2-7-19.

## 2021-06-22 NOTE — PROGRESS NOTES
HPI: This patient is a 32 yo who presents for follow-up of tonsils.  She was seen 11/16/18 for a tonsil lesion that was biopsied in the office.  The results showed chronic inflammation, but did not have viable epithelium tissue.  She is here for a recheck.  She complains of continued throat irritation on the right side.  Rates pain 4/10 and is sometimes worse in the morning as her throat is dry.  She thinks her voice is more raspy than normal. No evidence of reflux on previous exam and she denies history of reflux. Symptoms have not worsened, but have not improved.  Was using Magic mouthwash several times a day for relief. Denies fever, weight changes, dysphagia, or other new symptoms.    Past medical history, surgical history, social history, family history, medications, and allergies have been reviewed with the patient and are documented above.    Review of Systems: a 10-system review was performed. Pertinent positives are noted in the HPI and on a separate scanned document in the chart.    PHYSICAL EXAMINATION:  GEN: no acute distress, normocephalic  EYES: extraocular movements are intact, pupils are equal and round. Sclera clear.   EARS: auricles are normally formed.   NOSE: anterior nares are patent.   OC/OP: clear, dentition is in good repair. The tongue and palate are fully mobile and symmetric. Tonsils are 1+ without erythema, edema, lesion, or exudate.  HP/L (Mirror exam): base of tongue, vallecula, epiglottis, and pyriform sinuses are clear. The bilateral vocal folds are mobile and without lesion. There is no significant interarytenoid edema and erythema.  NECK: soft and supple. Tender to palpation on the right with small, palpable lymph node. Airway is midline.  NEURO: CN VII and XII are intact symmetric. alert and oriented. No nystagmus. Gait is normal.    MEDICAL DECISION-MAKING: Nereyda is a 32 yo with chronic throat/neck pain.  Reassured patient there was no clear source for her neck pain on exam  today.  The palpable lymph node is not concerning in size or shape and likely reactive.  Her tonsils are normal and symmetric today which makes tonsillitis, tonsilliths, or neoplasm less likely.  There's potential her symptoms are reflux related, although her exam did not reveal obvious signs of reflux.  Unclear of why she continues to have throat pain, so it is reasonable to obtain a CT scan of the neck given patient's level of concern.  My suspicion for anything worrisome is low, but will call patient with CT results when available.  If nothing is found on scan, will recommend trial of reflux medication for 6-8 weeks to see if symptoms improve.  We reviewed diet and lifestyle changes as well as risk/benefit of PPIs vs H2-blockers.  Patient is in good understanding and in agreement of plan.

## 2021-06-22 NOTE — TELEPHONE ENCOUNTER
Called patient to review thyroid ultrasound results.  Recommendation to have follow-up ultrasound in a year which can be done through PCP.  Patient with good understanding.

## 2021-07-07 DIAGNOSIS — Z11.59 ENCOUNTER FOR SCREENING FOR OTHER VIRAL DISEASES: ICD-10-CM

## 2021-07-19 ENCOUNTER — LAB (OUTPATIENT)
Dept: LAB | Facility: CLINIC | Age: 36
End: 2021-07-19
Payer: COMMERCIAL

## 2021-07-19 DIAGNOSIS — Z11.59 ENCOUNTER FOR SCREENING FOR OTHER VIRAL DISEASES: ICD-10-CM

## 2021-07-19 PROCEDURE — U0003 INFECTIOUS AGENT DETECTION BY NUCLEIC ACID (DNA OR RNA); SEVERE ACUTE RESPIRATORY SYNDROME CORONAVIRUS 2 (SARS-COV-2) (CORONAVIRUS DISEASE [COVID-19]), AMPLIFIED PROBE TECHNIQUE, MAKING USE OF HIGH THROUGHPUT TECHNOLOGIES AS DESCRIBED BY CMS-2020-01-R: HCPCS

## 2021-07-19 PROCEDURE — U0005 INFEC AGEN DETEC AMPLI PROBE: HCPCS

## 2021-07-19 ASSESSMENT — MIFFLIN-ST. JEOR: SCORE: 1504.15

## 2021-07-20 ENCOUNTER — ANESTHESIA EVENT (OUTPATIENT)
Dept: SURGERY | Facility: AMBULATORY SURGERY CENTER | Age: 36
End: 2021-07-20
Payer: COMMERCIAL

## 2021-07-20 LAB — SARS-COV-2 RNA RESP QL NAA+PROBE: NEGATIVE

## 2021-07-20 RX ORDER — LIDOCAINE 40 MG/G
CREAM TOPICAL
Status: DISCONTINUED | OUTPATIENT
Start: 2021-07-20 | End: 2021-07-22 | Stop reason: HOSPADM

## 2021-07-20 RX ORDER — FENTANYL CITRATE 50 UG/ML
50 INJECTION, SOLUTION INTRAMUSCULAR; INTRAVENOUS
Status: COMPLETED | OUTPATIENT
Start: 2021-07-20 | End: 2021-07-21

## 2021-07-21 ENCOUNTER — ANESTHESIA (OUTPATIENT)
Dept: SURGERY | Facility: AMBULATORY SURGERY CENTER | Age: 36
End: 2021-07-21
Payer: COMMERCIAL

## 2021-07-21 ENCOUNTER — HOSPITAL ENCOUNTER (OUTPATIENT)
Facility: AMBULATORY SURGERY CENTER | Age: 36
End: 2021-07-21
Attending: OBSTETRICS & GYNECOLOGY
Payer: COMMERCIAL

## 2021-07-21 VITALS
WEIGHT: 164 LBS | DIASTOLIC BLOOD PRESSURE: 57 MMHG | TEMPERATURE: 97 F | OXYGEN SATURATION: 100 % | RESPIRATION RATE: 16 BRPM | HEART RATE: 68 BPM | SYSTOLIC BLOOD PRESSURE: 111 MMHG | HEIGHT: 70 IN | BODY MASS INDEX: 23.48 KG/M2

## 2021-07-21 DIAGNOSIS — R87.611 PAP SMEAR OF CERVIX WITH ASCUS, CANNOT EXCLUDE HGSIL: ICD-10-CM

## 2021-07-21 DIAGNOSIS — R87.611 ATYPICAL SQUAMOUS CELLS CANNOT EXCLUDE HIGH GRADE SQUAMOUS INTRAEPITHELIAL LESION ON CYTOLOGIC SMEAR OF CERVIX (ASC-H): Primary | ICD-10-CM

## 2021-07-21 PROBLEM — Z87.42 HISTORY OF ABNORMAL CERVICAL PAPANICOLAOU SMEAR: Status: ACTIVE | Noted: 2021-07-21

## 2021-07-21 PROBLEM — G43.909 MIGRAINE: Status: ACTIVE | Noted: 2021-07-21

## 2021-07-21 LAB
HCG UR QL: NEGATIVE
INTERNAL QC OK POCT: NORMAL

## 2021-07-21 RX ORDER — ACETAMINOPHEN 325 MG/1
975 TABLET ORAL ONCE
Status: DISCONTINUED | OUTPATIENT
Start: 2021-07-21 | End: 2021-07-21 | Stop reason: CLARIF

## 2021-07-21 RX ORDER — SODIUM CHLORIDE, SODIUM LACTATE, POTASSIUM CHLORIDE, CALCIUM CHLORIDE 600; 310; 30; 20 MG/100ML; MG/100ML; MG/100ML; MG/100ML
INJECTION, SOLUTION INTRAVENOUS CONTINUOUS
Status: DISCONTINUED | OUTPATIENT
Start: 2021-07-21 | End: 2021-07-22 | Stop reason: HOSPADM

## 2021-07-21 RX ORDER — OXYCODONE HYDROCHLORIDE 5 MG/1
5 TABLET ORAL EVERY 4 HOURS PRN
Status: DISCONTINUED | OUTPATIENT
Start: 2021-07-21 | End: 2021-07-22 | Stop reason: HOSPADM

## 2021-07-21 RX ORDER — FENTANYL CITRATE 50 UG/ML
25 INJECTION, SOLUTION INTRAMUSCULAR; INTRAVENOUS EVERY 5 MIN PRN
Status: SHIPPED | OUTPATIENT
Start: 2021-07-21 | End: 2021-07-21

## 2021-07-21 RX ORDER — KETOROLAC TROMETHAMINE 30 MG/ML
INJECTION, SOLUTION INTRAMUSCULAR; INTRAVENOUS PRN
Status: DISCONTINUED | OUTPATIENT
Start: 2021-07-21 | End: 2021-07-21

## 2021-07-21 RX ORDER — MEPERIDINE HYDROCHLORIDE 25 MG/ML
12.5 INJECTION INTRAMUSCULAR; INTRAVENOUS; SUBCUTANEOUS
Status: DISCONTINUED | OUTPATIENT
Start: 2021-07-21 | End: 2021-07-22 | Stop reason: HOSPADM

## 2021-07-21 RX ORDER — GLYCOPYRROLATE 0.2 MG/ML
INJECTION, SOLUTION INTRAMUSCULAR; INTRAVENOUS PRN
Status: DISCONTINUED | OUTPATIENT
Start: 2021-07-21 | End: 2021-07-21

## 2021-07-21 RX ORDER — ACETAMINOPHEN 325 MG/1
650 TABLET ORAL EVERY 6 HOURS PRN
Status: DISCONTINUED | OUTPATIENT
Start: 2021-07-21 | End: 2021-07-22 | Stop reason: HOSPADM

## 2021-07-21 RX ORDER — ACETAMINOPHEN 325 MG/1
650 TABLET ORAL EVERY 4 HOURS PRN
Qty: 50 TABLET | Refills: 0 | COMMUNITY
Start: 2021-07-21

## 2021-07-21 RX ORDER — LIDOCAINE HYDROCHLORIDE 20 MG/ML
INJECTION, SOLUTION INFILTRATION; PERINEURAL PRN
Status: DISCONTINUED | OUTPATIENT
Start: 2021-07-21 | End: 2021-07-21

## 2021-07-21 RX ORDER — ONDANSETRON 4 MG/1
4 TABLET, ORALLY DISINTEGRATING ORAL EVERY 30 MIN PRN
Status: DISCONTINUED | OUTPATIENT
Start: 2021-07-21 | End: 2021-07-22 | Stop reason: HOSPADM

## 2021-07-21 RX ORDER — KETAMINE HYDROCHLORIDE 10 MG/ML
INJECTION, SOLUTION INTRAMUSCULAR; INTRAVENOUS PRN
Status: DISCONTINUED | OUTPATIENT
Start: 2021-07-21 | End: 2021-07-21

## 2021-07-21 RX ORDER — ONDANSETRON 2 MG/ML
4 INJECTION INTRAMUSCULAR; INTRAVENOUS EVERY 30 MIN PRN
Status: DISCONTINUED | OUTPATIENT
Start: 2021-07-21 | End: 2021-07-22 | Stop reason: HOSPADM

## 2021-07-21 RX ORDER — LIDOCAINE HYDROCHLORIDE AND EPINEPHRINE 10; 10 MG/ML; UG/ML
INJECTION, SOLUTION INFILTRATION; PERINEURAL PRN
Status: DISCONTINUED | OUTPATIENT
Start: 2021-07-21 | End: 2021-07-21 | Stop reason: HOSPADM

## 2021-07-21 RX ORDER — PROPOFOL 10 MG/ML
INJECTION, EMULSION INTRAVENOUS CONTINUOUS PRN
Status: DISCONTINUED | OUTPATIENT
Start: 2021-07-21 | End: 2021-07-21

## 2021-07-21 RX ORDER — OXYCODONE HYDROCHLORIDE 5 MG/1
5-10 TABLET ORAL EVERY 4 HOURS PRN
Qty: 4 TABLET | Refills: 0 | Status: SHIPPED | OUTPATIENT
Start: 2021-07-21

## 2021-07-21 RX ORDER — ONDANSETRON 2 MG/ML
INJECTION INTRAMUSCULAR; INTRAVENOUS PRN
Status: DISCONTINUED | OUTPATIENT
Start: 2021-07-21 | End: 2021-07-21

## 2021-07-21 RX ORDER — DEXAMETHASONE SODIUM PHOSPHATE 4 MG/ML
INJECTION, SOLUTION INTRA-ARTICULAR; INTRALESIONAL; INTRAMUSCULAR; INTRAVENOUS; SOFT TISSUE PRN
Status: DISCONTINUED | OUTPATIENT
Start: 2021-07-21 | End: 2021-07-21

## 2021-07-21 RX ORDER — FENTANYL CITRATE 50 UG/ML
50 INJECTION, SOLUTION INTRAMUSCULAR; INTRAVENOUS EVERY 5 MIN PRN
Status: SHIPPED | OUTPATIENT
Start: 2021-07-21 | End: 2021-07-21

## 2021-07-21 RX ORDER — SCOLOPAMINE TRANSDERMAL SYSTEM 1 MG/1
1 PATCH, EXTENDED RELEASE TRANSDERMAL ONCE
Status: DISCONTINUED | OUTPATIENT
Start: 2021-07-21 | End: 2021-07-21 | Stop reason: CLARIF

## 2021-07-21 RX ADMIN — KETOROLAC TROMETHAMINE 15 MG: 30 INJECTION, SOLUTION INTRAMUSCULAR; INTRAVENOUS at 08:11

## 2021-07-21 RX ADMIN — FENTANYL CITRATE 25 MCG: 50 INJECTION, SOLUTION INTRAMUSCULAR; INTRAVENOUS at 07:48

## 2021-07-21 RX ADMIN — GLYCOPYRROLATE 0.2 MG: 0.2 INJECTION, SOLUTION INTRAMUSCULAR; INTRAVENOUS at 07:46

## 2021-07-21 RX ADMIN — FENTANYL CITRATE 25 MCG: 50 INJECTION, SOLUTION INTRAMUSCULAR; INTRAVENOUS at 07:59

## 2021-07-21 RX ADMIN — KETAMINE HYDROCHLORIDE 10 MG: 10 INJECTION, SOLUTION INTRAMUSCULAR; INTRAVENOUS at 07:52

## 2021-07-21 RX ADMIN — DEXAMETHASONE SODIUM PHOSPHATE 4 MG: 4 INJECTION, SOLUTION INTRA-ARTICULAR; INTRALESIONAL; INTRAMUSCULAR; INTRAVENOUS; SOFT TISSUE at 07:52

## 2021-07-21 RX ADMIN — OXYCODONE HYDROCHLORIDE 5 MG: 5 TABLET ORAL at 08:56

## 2021-07-21 RX ADMIN — KETAMINE HYDROCHLORIDE 10 MG: 10 INJECTION, SOLUTION INTRAMUSCULAR; INTRAVENOUS at 07:48

## 2021-07-21 RX ADMIN — ONDANSETRON 4 MG: 2 INJECTION INTRAMUSCULAR; INTRAVENOUS at 07:46

## 2021-07-21 RX ADMIN — SODIUM CHLORIDE, SODIUM LACTATE, POTASSIUM CHLORIDE, CALCIUM CHLORIDE: 600; 310; 30; 20 INJECTION, SOLUTION INTRAVENOUS at 07:22

## 2021-07-21 RX ADMIN — LIDOCAINE HYDROCHLORIDE 50 MG: 20 INJECTION, SOLUTION INFILTRATION; PERINEURAL at 07:46

## 2021-07-21 RX ADMIN — PROPOFOL 180 MCG/KG/MIN: 10 INJECTION, EMULSION INTRAVENOUS at 07:48

## 2021-07-21 RX ADMIN — FENTANYL CITRATE 25 MCG: 50 INJECTION, SOLUTION INTRAMUSCULAR; INTRAVENOUS at 08:32

## 2021-07-21 ASSESSMENT — LIFESTYLE VARIABLES: TOBACCO_USE: 1

## 2021-07-21 ASSESSMENT — MIFFLIN-ST. JEOR: SCORE: 1504.15

## 2021-07-21 NOTE — ANESTHESIA POSTPROCEDURE EVALUATION
Patient: Nereyda Trivedi    Procedure(s):  LOOP ELECTROSURGICAL EXCISION PROCEDURE (LEEP)    Diagnosis:Pap smear of cervix with ASCUS, cannot exclude HGSIL [R87.611]  Diagnosis Additional Information: No value filed.    Anesthesia Type:  MAC    Note:  Disposition: Outpatient   Postop Pain Control: Uneventful            Sign Out: Well controlled pain   PONV: No   Neuro/Psych: Uneventful            Sign Out: Acceptable/Baseline neuro status   Airway/Respiratory: Uneventful            Sign Out: Acceptable/Baseline resp. status   CV/Hemodynamics: Uneventful            Sign Out: Acceptable CV status; No obvious hypovolemia; No obvious fluid overload   Other NRE: NONE   DID A NON-ROUTINE EVENT OCCUR? No           Last vitals:  Vitals Value Taken Time   /57 07/21/21 0915   Temp 97  F (36.1  C) 07/21/21 0819   Pulse 65 07/21/21 0917   Resp 16 07/21/21 0819   SpO2 100 % 07/21/21 0917   Vitals shown include unvalidated device data.    Electronically Signed By: Gagan Rene MD  July 21, 2021  9:47 AM

## 2021-07-21 NOTE — ANESTHESIA CARE TRANSFER NOTE
Patient: Nereyda Trivedi    Procedure(s):  LOOP ELECTROSURGICAL EXCISION PROCEDURE (LEEP)    Diagnosis: Pap smear of cervix with ASCUS, cannot exclude HGSIL [R87.611]  Diagnosis Additional Information: No value filed.    Anesthesia Type:   MAC     Note:    Oropharynx: oropharynx clear of all foreign objects  Level of Consciousness: drowsy  Oxygen Supplementation: face mask  Level of Supplemental Oxygen (L/min / FiO2): 8  Independent Airway: airway patency satisfactory and stable  Dentition: dentition unchanged  Vital Signs Stable: post-procedure vital signs reviewed and stable  Report to RN Given: handoff report given  Patient transferred to: PACU    Handoff Report: Identifed the Patient, Identified the Reponsible Provider, Reviewed the pertinent medical history, Discussed the surgical course, Reviewed Intra-OP anesthesia mangement and issues during anesthesia, Set expectations for post-procedure period and Allowed opportunity for questions and acknowledgement of understanding      Vitals:  Vitals Value Taken Time   BP 90/52 07/21/21 0817   Temp 98.6  F (37  C) 07/21/21 0817   Pulse 66 07/21/21 0817   Resp 14 07/21/21 0817   SpO2 100 % 07/21/21 0817       Electronically Signed By: DARWIN Dowd CRNA  July 21, 2021  8:18 AM

## 2021-07-21 NOTE — H&P
I have reviewed the H and P- patient seen for annual exam in June 2021.  Patient is in good health and no contraindications for MAC anesthesia.  Patient continues to have abnormal pap and risk of cervical dysplasia, cancer.  Risks and benefits of excisional biopsy reviewed, ok to proceed.    Ame Peguero MD FACOG  [facility]  [phone]

## 2021-07-21 NOTE — OP NOTE
PROCEDURE NOTE: LEEP     NAME: Nereyda Trivedi  : 1985   MRN: [id number]     DATE OF SERVICE: 2021     PREOPERATIVE DIAGNOSIS : persistent ASC- H pap and HPV positive x 2 years    POSTOPERATIVE DIAGNOSIS: ronnie    PROCEDURE:  Loop Electrosurgical Excision Procedure [facility] Biopsy and endocervical biopsy    SURGEON:  Ame Peguero MD     ANESTHESIA:  MAC and local infiltration of 1% lidocaine with epi, total 15 cc    CATHETER:  none    COMPLICATIONS:  None    ESTIMATED BLOOD LOSS: 2 mL    CONDITION ON DISCHARGE: Satisfactory    HISTORY   This is a 36 year old  female with risk of cervical dysplasia noted on pap.  The risks, benefits, and alternatives were discussed to include bleeding, infection, need for further procedures. She expressed understanding and wished to proceed.     PROCEDURE NOTE   The patient was brought to the operating room and after induction of IV sedation, was placed in the dorsal lithotomy position. A time out was called and the patient and the procedure were verified.  A non-conducting bivalve speculum was placed and the cervix was infiltrated with 1% lidocaine with epinephrine. A blue 20mm x 15 mm loop was then used to carve out a cervical specimen, which was opened at the 12 position. This was submitted for pathologic exam.  ECC was obtained as well.  A 3 mm  ball-tipped cautery was then used to cauterize the cervical stump. Monsel solution was then applied. Good hemostasis was noted. At this time decision was made to terminate the procedure. All the instruments were removed and the patient was taken to the recovery room in good condition.     Ame Peguero MD FACOG  [facility]  [phone]    CC: [place], Ame Peguero MD

## 2021-07-21 NOTE — ANESTHESIA PREPROCEDURE EVALUATION
Anesthesia Pre-Procedure Evaluation    Patient: Nereyda Trivedi   MRN: [id number] : 1985        Preoperative Diagnosis: Pap smear of cervix with ASCUS, cannot exclude HGSIL [R87.611]   Procedure : Procedure(s):  LOOP ELECTROSURGICAL EXCISION PROCEDURE (LEEP)     Past Medical History:   Diagnosis Date     Other chronic pain      Ovarian cyst       Past Surgical History:   Procedure Laterality Date     GYN SURGERY      laparoscopy X 3     ORTHOPEDIC SURGERY       PA RX ECTOP PREG BY SCOPE,RMV TUBE/OVRY      Description: Laparoscopic Excision Of Ectopic Preg & Salpingectomy Left;  Recorded: 2014;      Allergies   Allergen Reactions     Hydrocodone Hives and Itching     Sulfa Drugs Rash      Social History     Tobacco Use     Smoking status: Former Smoker     Quit date: 2011     Years since quitting: 10.0     Smokeless tobacco: Never Used     Tobacco comment: social   Substance Use Topics     Alcohol use: Yes     Comment: socially, 6/week      Wt Readings from Last 1 Encounters:   21 74.4 kg (164 lb)        Anesthesia Evaluation   Pt has had prior anesthetic. Type: General.    No history of anesthetic complications       ROS/MED HX  ENT/Pulmonary:  - neg pulmonary ROS   (+) tobacco use (remote),     Neurologic:  - neg neurologic ROS     Cardiovascular:  - neg cardiovascular ROS     METS/Exercise Tolerance:     Hematologic:  - neg hematologic  ROS     Musculoskeletal:  - neg musculoskeletal ROS     GI/Hepatic:  - neg GI/hepatic ROS     Renal/Genitourinary:  - neg Renal ROS     Endo:  - neg endo ROS     Psychiatric/Substance Use:  - neg psychiatric ROS     Infectious Disease:  - neg infectious disease ROS     Malignancy:  - neg malignancy ROS     Other:  - neg other ROS          Physical Exam    Airway  airway exam normal           Respiratory Devices and Support         Dental  no notable dental history         Cardiovascular   cardiovascular exam normal          Pulmonary   pulmonary exam  normal                OUTSIDE LABS:  CBC:   Lab Results   Component Value Date    WBC 3.7 (L) 04/03/2018    WBC 4.8 02/22/2018    HGB 15.0 04/03/2018    HGB 14.2 02/22/2018    HCT 41.9 04/03/2018    HCT 40.7 02/22/2018     04/03/2018     02/22/2018     BMP:   Lab Results   Component Value Date     04/03/2018     02/22/2018    POTASSIUM 4.2 04/03/2018    POTASSIUM 4.2 02/22/2018    CHLORIDE 102 04/03/2018    CHLORIDE 104 02/22/2018    CO2 26 04/03/2018    CO2 25 02/22/2018    BUN 10 04/03/2018    BUN 12 02/22/2018    CR 0.78 04/03/2018    CR 0.71 02/22/2018    GLC 94 04/03/2018    GLC 81 02/22/2018     COAGS: No results found for: PTT, INR, FIBR  POC:   Lab Results   Component Value Date    HCG Negative 07/21/2021     HEPATIC:   Lab Results   Component Value Date    ALBUMIN 4.2 02/22/2018    PROTTOTAL 8.0 02/22/2018    ALT 22 02/22/2018    AST 24 02/22/2018    ALKPHOS 96 02/22/2018    BILITOTAL 0.6 02/22/2018     OTHER:   Lab Results   Component Value Date    KATH 9.5 04/03/2018    TSH 1.15 02/22/2018       Anesthesia Plan    ASA Status:  2      Anesthesia Type: MAC.   Induction: Propofol.   Maintenance: TIVA.        Consents    Anesthesia Plan(s) and associated risks, benefits, and realistic alternatives discussed. Questions answered and patient/representative(s) expressed understanding.     - Discussed with:  Patient         Postoperative Care    Pain management: Multi-modal analgesia.   PONV prophylaxis: Ondansetron (or other 5HT-3), Dexamethasone or Solumedrol     Comments:                Gagan Rene MD

## 2021-07-21 NOTE — DISCHARGE INSTRUCTIONS
You received a medication called Toradol (a stronger IV ibuprofen) at 8:11 AM. Do NOT take any Ibuprofen/Advil/Aleve/Naproxen or products containing Ibuprofen until 2:11 PM or later.

## 2021-07-31 ENCOUNTER — HEALTH MAINTENANCE LETTER (OUTPATIENT)
Age: 36
End: 2021-07-31

## 2021-09-25 ENCOUNTER — HEALTH MAINTENANCE LETTER (OUTPATIENT)
Age: 36
End: 2021-09-25

## 2022-08-27 ENCOUNTER — HEALTH MAINTENANCE LETTER (OUTPATIENT)
Age: 37
End: 2022-08-27

## 2023-01-07 ENCOUNTER — HEALTH MAINTENANCE LETTER (OUTPATIENT)
Age: 38
End: 2023-01-07

## 2023-09-23 ENCOUNTER — HEALTH MAINTENANCE LETTER (OUTPATIENT)
Age: 38
End: 2023-09-23

## 2023-12-14 ENCOUNTER — LAB REQUISITION (OUTPATIENT)
Dept: LAB | Facility: CLINIC | Age: 38
End: 2023-12-14

## 2023-12-14 DIAGNOSIS — Z12.4 ENCOUNTER FOR SCREENING FOR MALIGNANT NEOPLASM OF CERVIX: ICD-10-CM

## 2023-12-14 PROCEDURE — 87624 HPV HI-RISK TYP POOLED RSLT: CPT | Performed by: OBSTETRICS & GYNECOLOGY

## 2023-12-14 PROCEDURE — G0145 SCR C/V CYTO,THINLAYER,RESCR: HCPCS | Performed by: OBSTETRICS & GYNECOLOGY

## 2023-12-18 LAB
BKR LAB AP GYN ADEQUACY: NORMAL
BKR LAB AP GYN INTERPRETATION: NORMAL
BKR LAB AP HPV REFLEX: NORMAL
BKR LAB AP LMP: NORMAL
BKR LAB AP PREVIOUS ABNL DX: NORMAL
BKR LAB AP PREVIOUS ABNORMAL: NORMAL
PATH REPORT.COMMENTS IMP SPEC: NORMAL
PATH REPORT.COMMENTS IMP SPEC: NORMAL
PATH REPORT.RELEVANT HX SPEC: NORMAL

## 2023-12-20 LAB
HUMAN PAPILLOMA VIRUS 16 DNA: NEGATIVE
HUMAN PAPILLOMA VIRUS 18 DNA: NEGATIVE
HUMAN PAPILLOMA VIRUS FINAL DIAGNOSIS: NORMAL
HUMAN PAPILLOMA VIRUS OTHER HR: NEGATIVE

## 2024-02-10 ENCOUNTER — HEALTH MAINTENANCE LETTER (OUTPATIENT)
Age: 39
End: 2024-02-10

## 2025-05-16 ENCOUNTER — LAB REQUISITION (OUTPATIENT)
Dept: LAB | Facility: CLINIC | Age: 40
End: 2025-05-16

## 2025-05-16 DIAGNOSIS — Z12.4 ENCOUNTER FOR SCREENING FOR MALIGNANT NEOPLASM OF CERVIX: ICD-10-CM

## 2025-05-16 PROCEDURE — G0145 SCR C/V CYTO,THINLAYER,RESCR: HCPCS | Performed by: OBSTETRICS & GYNECOLOGY

## 2025-05-16 PROCEDURE — 87624 HPV HI-RISK TYP POOLED RSLT: CPT | Performed by: OBSTETRICS & GYNECOLOGY

## 2025-05-19 LAB
HPV HR 12 DNA CVX QL NAA+PROBE: NEGATIVE
HPV16 DNA CVX QL NAA+PROBE: NEGATIVE
HPV18 DNA CVX QL NAA+PROBE: NEGATIVE
HUMAN PAPILLOMA VIRUS FINAL DIAGNOSIS: NORMAL

## 2025-05-21 LAB
BKR AP ASSOCIATED HPV REPORT: NORMAL
BKR LAB AP GYN ADEQUACY: NORMAL
BKR LAB AP GYN INTERPRETATION: NORMAL
BKR LAB AP LMP: NORMAL
BKR LAB AP PREVIOUS ABNL DX: NORMAL
BKR LAB AP PREVIOUS ABNORMAL: NORMAL
PATH REPORT.COMMENTS IMP SPEC: NORMAL
PATH REPORT.COMMENTS IMP SPEC: NORMAL
PATH REPORT.RELEVANT HX SPEC: NORMAL